# Patient Record
Sex: MALE | Race: WHITE | Employment: UNEMPLOYED | URBAN - METROPOLITAN AREA
[De-identification: names, ages, dates, MRNs, and addresses within clinical notes are randomized per-mention and may not be internally consistent; named-entity substitution may affect disease eponyms.]

---

## 2017-11-15 ENCOUNTER — HOSPITAL ENCOUNTER (EMERGENCY)
Facility: CLINIC | Age: 47
Discharge: HOME | End: 2017-11-15
Attending: EMERGENCY MEDICINE | Admitting: EMERGENCY MEDICINE

## 2017-11-15 VITALS
SYSTOLIC BLOOD PRESSURE: 185 MMHG | HEART RATE: 92 BPM | RESPIRATION RATE: 20 BRPM | OXYGEN SATURATION: 98 % | DIASTOLIC BLOOD PRESSURE: 148 MMHG | TEMPERATURE: 97.2 F | WEIGHT: 200 LBS

## 2017-11-15 DIAGNOSIS — L98.9 SKIN LESION: ICD-10-CM

## 2017-11-15 DIAGNOSIS — L73.9 FOLLICULITIS: ICD-10-CM

## 2017-11-15 DIAGNOSIS — L02.92 BOIL: ICD-10-CM

## 2017-11-15 PROCEDURE — 99282 EMERGENCY DEPT VISIT SF MDM: CPT | Performed by: EMERGENCY MEDICINE

## 2017-11-15 PROCEDURE — 99283 EMERGENCY DEPT VISIT LOW MDM: CPT | Mod: Z6 | Performed by: EMERGENCY MEDICINE

## 2017-11-15 RX ORDER — SULFAMETHOXAZOLE/TRIMETHOPRIM 800-160 MG
1 TABLET ORAL 2 TIMES DAILY
Qty: 20 TABLET | Refills: 0 | Status: SHIPPED | OUTPATIENT
Start: 2017-11-15 | End: 2017-11-25

## 2017-11-15 ASSESSMENT — ENCOUNTER SYMPTOMS
WOUND: 1
SHORTNESS OF BREATH: 0
FEVER: 0
ABDOMINAL PAIN: 0

## 2017-11-15 NOTE — ED AVS SNAPSHOT
[facility] Emergency Department    [address] [place] [address]    Phone:  [phone]    Fax:  [phone]                                       Javi Rosenberg Jr.   MRN: [id number]    Department:  [facility] Emergency Department   Date of Visit:  11/15/2017           Patient Information     Date Of Birth          1970        Your diagnoses for this visit were:     Skin lesion     Boil     Folliculitis        You were seen by Javi Gonzalez MD.        Discharge Instructions       Take antibiotics as prescribed.  This can be obtained for $4 at [employer].  Other pharmacies have coupons for this medicine for approximately $10-$11        Understanding Folliculitis  Folliculitis is when hair follicles become inflamed. Follicles are the tiny holes from which hair grows out of your skin. This skin condition can occur any place on the body where hair grows. But it s often found on the neck, face, and scalp.  How to say it  [id number]   What causes folliculitis?  An infection or irritation can cause this skin condition. It may be from bacteria or a fungus. The condition can also happen from a wound or irritation of the skin. Shaving is a common cause. Some cases may come from taking certain medicines, such as those that treat acne.  Symptoms of folliculitis  This skin condition tends to develop quickly. It looks like little pimples on a base of a red, inflamed hair follicles. These bumps may ooze pus. They may also be:    Itchy    Painful    Red    Swollen  Treatment for folliculitis  Treatment depends on the cause of the inflammation. In some cases, this skin condition will go away on its own in a few days. But it may return. Treatment options include:    Warm compress. Putting a warm, wet washcloth on the inflamed skin may help.    Medicine. Many skin (topical) and oral medicines are available. Antibiotics are used for bacterial infections. Antifungal medicines are best  for fungal infections.    Good hygiene. Keeping the skin clean can help. Use a clean razor when shaving. Prevent ingrown hairs after shaving. This can reduce folliculitis in the beard area. Avoid any substances that bother your skin.  When to call your healthcare provider  Call your healthcare provider right away if you have any of these:    Fever of 100.4 F (38 C) or higher, or as directed    Pain that gets worse    Symptoms that don t get better, or get worse    New symptoms   Date Last Reviewed: 5/1/2016 2000-2017 [employer]. [address] [place] [address]. All rights reserved. This information is not intended as a substitute for professional medical care. Always follow your healthcare professional's instructions.          24 Hour Appointment Hotline       To make an appointment at any [facility], call [phone] ([phone]). If you don't have a family doctor or clinic, we will help you find one. [facility] are conveniently located to serve the needs of you and your family.             Review of your medicines      START taking        Dose / Directions Last dose taken    sulfamethoxazole-trimethoprim 800-160 MG per tablet   Commonly known as:  BACTRIM DS   Dose:  1 tablet   Quantity:  20 tablet        Take 1 tablet by mouth 2 times daily for 10 days   Refills:  0                Prescriptions were sent or printed at these locations (1 Prescription)                   Other Prescriptions                Printed at Department/Unit printer (1 of 1)         sulfamethoxazole-trimethoprim (BACTRIM DS) 800-160 MG per tablet                Orders Needing Specimen Collection     None      Pending Results     No orders found from 11/13/2017 to 11/16/2017.            Pending Culture Results     No orders found from 11/13/2017 to 11/16/2017.            Pending Results Instructions     If you had any lab results that were not finalized at the time of your Discharge, you can call  "the ED Lab Result RN at [phone]. You will be contacted by this team for any positive Lab results or changes in treatment. The nurses are available 7 days a week from 10A to 6:30P.  You can leave a message 24 hours per day and they will return your call.        Thank you for choosing [facility]       Thank you for choosing [facility] for your care. Our goal is always to provide you with excellent care. Hearing back from our patients is one way we can continue to improve our services. Please take a few minutes to complete the written survey that you may receive in the mail after you visit with us. Thank you!        [facility] Information     [facility] lets you send messages to your doctor, view your test results, renew your prescriptions, schedule appointments and more. To sign up, go to [email or web address] . Click on \"Log in\" on the left side of the screen, which will take you to the Welcome page. Then click on \"Sign up Now\" on the right side of the page.     You will be asked to enter the access code listed below, as well as some personal information. Please follow the directions to create your username and password.     Your access code is: [email or web address]  Expires: 2018 11:47 AM     Your access code will  in 90 days. If you need help or a new code, please call your [facility] clinic or [phone].        Care EveryWhere ID     This is your Care EveryWhere ID. This could be used by other organizations to access your [facility] medical records  [id number]        Equal Access to Services     BORIS HOPKINS : Hadii genevieve ku hadasho Soomaali, waaxda luqadaha, qaybta kaalmada adeegyada, ayleen lord . So [facility] [phone].    ATENCIÓN: Si habla español, tiene a adams disposición servicios gratuitos de asistencia lingüística. Llame al [phone].    We comply with applicable federal civil rights laws and [place] laws. We do not discriminate on the basis of race, color, national origin, " age, disability, sex, sexual orientation, or gender identity.            After Visit Summary       This is your record. Keep this with you and show to your community pharmacist(s) and doctor(s) at your next visit.

## 2017-11-15 NOTE — ED AVS SNAPSHOT
[facility] Emergency Department    [address] [place] [address]    Phone:  [phone]    Fax:  [phone]                                       Javi Rosenberg Jr.   MRN: [id number]    Department:  [facility] Emergency Department   Date of Visit:  11/15/2017           After Visit Summary Signature Page     I have received my discharge instructions, and my questions have been answered. I have discussed any challenges I see with this plan with the nurse or doctor.    ..........................................................................................................................................  Patient/Patient Representative Signature      ..........................................................................................................................................  Patient Representative Print Name and Relationship to Patient    ..................................................               ................................................  Date                                            Time    ..........................................................................................................................................  Reviewed by Signature/Title    ...................................................              ..............................................  Date                                                            Time

## 2017-11-15 NOTE — ED NOTES
Patient has multiple areas of rash to his legs and groin which look like large pimples and are very sore.

## 2017-11-15 NOTE — DISCHARGE INSTRUCTIONS
Take antibiotics as prescribed.  This can be obtained for $4 at [employer].  Other pharmacies have coupons for this medicine for approximately $10-$11        Understanding Folliculitis  Folliculitis is when hair follicles become inflamed. Follicles are the tiny holes from which hair grows out of your skin. This skin condition can occur any place on the body where hair grows. But it s often found on the neck, face, and scalp.  How to say it  [id number]   What causes folliculitis?  An infection or irritation can cause this skin condition. It may be from bacteria or a fungus. The condition can also happen from a wound or irritation of the skin. Shaving is a common cause. Some cases may come from taking certain medicines, such as those that treat acne.  Symptoms of folliculitis  This skin condition tends to develop quickly. It looks like little pimples on a base of a red, inflamed hair follicles. These bumps may ooze pus. They may also be:    Itchy    Painful    Red    Swollen  Treatment for folliculitis  Treatment depends on the cause of the inflammation. In some cases, this skin condition will go away on its own in a few days. But it may return. Treatment options include:    Warm compress. Putting a warm, wet washcloth on the inflamed skin may help.    Medicine. Many skin (topical) and oral medicines are available. Antibiotics are used for bacterial infections. Antifungal medicines are best for fungal infections.    Good hygiene. Keeping the skin clean can help. Use a clean razor when shaving. Prevent ingrown hairs after shaving. This can reduce folliculitis in the beard area. Avoid any substances that bother your skin.  When to call your healthcare provider  Call your healthcare provider right away if you have any of these:    Fever of 100.4 F (38 C) or higher, or as directed    Pain that gets worse    Symptoms that don t get better, or get worse    New symptoms   Date Last Reviewed: 5/1/2016 2000-2017 [employer]. [address] [place] [address], [place] [address]. All rights reserved. This information is not intended as a substitute for professional medical care. Always follow your healthcare professional's instructions.

## 2017-11-15 NOTE — ED PROVIDER NOTES
History     Chief Complaint   Patient presents with     Rash     HPI  Javi Rosenberg Jr. is a 47 year old male who is otherwise healthy, presenting to the emergency Department with complaints of multiple lesions diffusely throughout the skin.  Patient states that he has in the past had skin lesions that frequently pop up on the face, however disappear on their own.  However, over the past 4-5 days, patient has had multiple skin lesions located on his lower extremities, groin, buttock, that have not gone away.  He denies any fever.  No recent antibiotic use, or other medication use.  No chest pain and abdominal pain, shortness of breath, or other symptoms.  Reports no history of MRSA.  However, chart review does show that patient has had culture positive MRSA previously.    Problem List:    There are no active problems to display for this patient.       Past Medical History:    Past Medical History:   Diagnosis Date     MRSA (methicillin resistant staph aureus) culture positive        Past Surgical History:    History reviewed. No pertinent surgical history.    Family History:    No family history on file.    Social History:  Marital Status:  Single [1]  Social History   Substance Use Topics     Smoking status: Not on file     Smokeless tobacco: Not on file     Alcohol use Not on file        Medications:      sulfamethoxazole-trimethoprim (BACTRIM DS) 800-160 MG per tablet         Review of Systems   Constitutional: Negative for fever.   Respiratory: Negative for shortness of breath.    Cardiovascular: Negative for chest pain.   Gastrointestinal: Negative for abdominal pain.   Skin: Positive for wound.   All other systems reviewed and are negative.      Physical Exam   BP: (!) 185/148  Pulse: 92  Temp: 97.2  F (36.2  C)  Resp: 20  Weight: 90.7 kg (200 lb)  SpO2: 98 %      Physical Exam  BP (!) 185/148  Pulse 92  Temp 97.2  F (36.2  C) (Oral)  Resp 20  Wt 90.7 kg (200 lb)  SpO2 98%  General: alert and in no  acute distress  Head: atraumatic, normocephalic  Abd: Soft, nontender, nondistended, no peritoneal signs  Musculoskel/Extremities: multiple skin lesions of legs, buttock.  All measuring about 3cm or less, with baseline erythema.  There are some areas where there are small pustular lesions, none of which would require incision and drainage at this point.  Neuro: Patient awake, alert, oriented, speech is fluent, gait is normal  Psychiatric: affect/mood normal, cooperative, normal judgement/insight and memory intact      ED Course     ED Course     Procedures               Critical Care time:  none               Labs Ordered and Resulted from Time of ED Arrival Up to the Time of Departure from the ED - No data to display    Assessments & Plan (with Medical Decision Making)  47 year old male , healthy, with no medical care as patient does not have any insurance, presenting to the emergency department today because of skin lesions.  He has noticed these have increased over the past approximately 4 days.  Denies fever, chills.  No other symptoms.  Patient does arrive hypertensive, and otherwise normal vitals.  No other concerns based on history, and physical exam is consistent with multiple areas of folliculitis, with small pustular lesions, consistent with likely MRSA infection.  Patient will be prescribed Bactrim, follow up as needed with primary care provider.  No indication for additional testing at this point.       I have reviewed the nursing notes.    I have reviewed the findings, diagnosis, plan and need for follow up with the patient.       New Prescriptions    SULFAMETHOXAZOLE-TRIMETHOPRIM (BACTRIM DS) 800-160 MG PER TABLET    Take 1 tablet by mouth 2 times daily for 10 days       Final diagnoses:   Skin lesion   Boil   Folliculitis       11/15/2017   [facility] EMERGENCY DEPARTMENT     Javi Gonzalez MD  11/15/17 2813

## 2018-01-25 ENCOUNTER — HOSPITAL ENCOUNTER (EMERGENCY)
Facility: CLINIC | Age: 48
Discharge: HOME | End: 2018-01-25
Attending: PHYSICIAN ASSISTANT | Admitting: PHYSICIAN ASSISTANT

## 2018-01-25 VITALS
HEART RATE: 115 BPM | DIASTOLIC BLOOD PRESSURE: 125 MMHG | SYSTOLIC BLOOD PRESSURE: 171 MMHG | TEMPERATURE: 98.2 F | WEIGHT: 199.96 LBS | OXYGEN SATURATION: 98 % | RESPIRATION RATE: 18 BRPM

## 2018-01-25 DIAGNOSIS — L73.8 FOLLICULITIS BARBAE: ICD-10-CM

## 2018-01-25 DIAGNOSIS — I10 ELEVATED BLOOD PRESSURE READING WITH DIAGNOSIS OF HYPERTENSION: ICD-10-CM

## 2018-01-25 DIAGNOSIS — L02.92 BOIL: ICD-10-CM

## 2018-01-25 PROCEDURE — 99284 EMERGENCY DEPT VISIT MOD MDM: CPT | Mod: Z6 | Performed by: PHYSICIAN ASSISTANT

## 2018-01-25 PROCEDURE — 99282 EMERGENCY DEPT VISIT SF MDM: CPT | Performed by: PHYSICIAN ASSISTANT

## 2018-01-25 RX ORDER — SULFAMETHOXAZOLE/TRIMETHOPRIM 800-160 MG
1 TABLET ORAL 2 TIMES DAILY
Qty: 20 TABLET | Refills: 0 | Status: SHIPPED | OUTPATIENT
Start: 2018-01-25 | End: 2018-02-04

## 2018-01-25 ASSESSMENT — ENCOUNTER SYMPTOMS
MYALGIAS: 0
FEVER: 0
CHILLS: 0
WOUND: 1
SHORTNESS OF BREATH: 0

## 2018-01-25 NOTE — ED NOTES
Rash on face and boils on sonia area. MRSA with treatment 1 month ago and rash temporarily resolved and is now back.

## 2018-01-25 NOTE — ED PROVIDER NOTES
History     Chief Complaint   Patient presents with     Rash     HPI  Javi Rosenberg Jr. is a 47 year old male who presents to the emergency department complaining of a rash.  Patient complains that for the last week and a half he has had issues with boils on his buttocks.  They are very sore to sit on and have drained purulent discharge at times.  He has had issues with this in the past, more recently in November 2017 where he was evaluated here in the ED.  He was prescribed Bactrim was cleared things up until now.  He also notes a rash to his face and scalp that he has had for over a year.  It is non-itchy, nonpainful, and does not drain anything.  This also cleared up after being on Bactrim back in November, but in the last week and a half has come back.  It causes him to feel self-conscious because it makes him look like he has acne.  He denies fever, body aches, chills, or any other symptoms.  He has not taken anything for his symptoms.    Problem List:    There are no active problems to display for this patient.       Past Medical History:    Past Medical History:   Diagnosis Date     MRSA (methicillin resistant staph aureus) culture positive        Past Surgical History:    History reviewed. No pertinent surgical history.    Family History:    No family history on file.    Social History:  Marital Status:  Single [1]  Social History   Substance Use Topics     Smoking status: Former Smoker     Smokeless tobacco: Never Used     Alcohol use Yes      Comment: occ        Medications:      sulfamethoxazole-trimethoprim (BACTRIM DS) 800-160 MG per tablet         Review of Systems   Constitutional: Negative for chills and fever.   Respiratory: Negative for shortness of breath.    Cardiovascular: Negative for chest pain.   Musculoskeletal: Negative for myalgias.   Skin: Positive for rash (face) and wound (buttocks).   All other systems reviewed and are negative.      Physical Exam   BP: (!) 180/122  Pulse:  122  Heart Rate: 122  Temp: 98.2  F (36.8  C)  Resp: 18  Weight: 90.7 kg (199 lb 15.3 oz)  SpO2: 98 %      Physical Exam   Constitutional: He is oriented to person, place, and time. He appears well-developed and well-nourished. No distress.   HENT:   Head: Normocephalic and atraumatic.   Eyes: Conjunctivae are normal.   Neck: Normal range of motion.   Pulmonary/Chest: Effort normal. No respiratory distress.   Musculoskeletal: Normal range of motion.   Neurological: He is alert and oriented to person, place, and time.   Skin: Skin is warm and dry. He is not diaphoretic.   Multiple erythematous lesions approximately 3 cm in diameter to buttocks. They are scabbed over, but tender to touch.  No underlying fluctuance.  Maculopapular rash to face near beard line and up to hairline.  Nontender, no purulent drainage.   Psychiatric: His mood appears anxious. His speech is rapid and/or pressured.       ED Course     ED Course     Procedures      Assessments & Plan (with Medical Decision Making)  Javi LOPEZ Chet Medel Is a 47 year old who presented to the ED complaining of a rash to his face and boils to his buttocks.  This began a week and a half ago, but he has had issues with this in the past.  Denies any other symptoms.  On arrival to the ED he was hypertensive and tachycardic but very anxious. He had a maculopapular rash across his face and the beard line up to the scalp.  Also with several erythematous, tender lesions to buttocks concerning for cellulitis without underlying fluctuance to suggest indication for drainage today.  His facial rash he has had for over a year, and it does appear somewhat consistent with folliculitis barbae. He has a history of MRSA.  He reports that Bactrim did help resolve rash for several months, so this will be prescribed again today.  I do think he needs to follow-up in the clinic for further management of his rash.  He was provided the contact information of jerry in [facility] to  call and schedule this appointment.  He should refrain from shaving until rash resolves, and I advised good personal hygiene to prevent recurrence.  Also of note, blood pressure rechecked prior to discharge and was still quite elevated.  Patient explained that he is supposed to be taking atenolol but has not been for 2 years due to cost.  He has no symptoms of chest pain, shortness of breath, blurred vision, or headaches. I did suggest restarting this medication and doing formal eval for his blood pressure here, but he declined. He was then encouraged to at least get this reevaluated in outpatient basis. He was given instructions to return to the ED if symptoms worsen.  Patient in agreement with plan and discharged home.     I have reviewed the nursing notes.    I have reviewed the findings, diagnosis, plan and need for follow up with the patient.    New Prescriptions    SULFAMETHOXAZOLE-TRIMETHOPRIM (BACTRIM DS) 800-160 MG PER TABLET    Take 1 tablet by mouth 2 times daily for 10 days       Final diagnoses:   Boil   Folliculitis barbae   Elevated blood pressure reading with diagnosis of hypertension       Note: Chart documentation done in part with Dragon Voice Recognition software. Although reviewed after completion, some word and grammatical errors may remain.     1/25/2018   [facility] EMERGENCY DEPARTMENT     Abi Euceda PA-C  01/25/18 [id number]

## 2018-01-25 NOTE — ED AVS SNAPSHOT
[facility] Emergency Department    [address] [place] [address]    Phone:  [phone]    Fax:  [phone]                                       Javi Rosenberg Jr.   MRN: [id number]    Department:  [facility] Emergency Department   Date of Visit:  1/25/2018           Patient Information     Date Of Birth          1970        Your diagnoses for this visit were:     Boil     Folliculitis barbae        You were seen by Abi Euceda PA-C.      Follow-up Information     Call [facility].    Specialty:  Family Practice    Why:  For ER follow up    Contact information:    [address]  [place] [id number] [phone]    Additional information:    Clinic is located at [address] [place] [address]        Call [facility].    Specialty:  Family Practice    Why:  For ER follow up    Contact information:    [address] [facility] [address] [facility] [address] [phone]    Additional information:    Clinic is located at [address] [place] [address]        Follow up with [facility] Emergency Department.    Specialty:  EMERGENCY MEDICINE    Why:  If symptoms worsen    Contact information:    [address]   [facility] [id number] [phone]    Additional information:    From [address]: Exit at [address] ([place], [place]) on south side of [place]. Turn right on [address]. Turn left at stoplight on [address]. [facility] will be in view two blocks ahead        Discharge Instructions       Take the antibiotic as prescribed.  Call and schedule a follow-up visit in the clinic for reassessment as soon as possible.  Return to the emergency department if you develop fever, body aches, or other worsening symptoms.    Thank you for choosing [facility]'s Emergency Department. It was a pleasure taking care of you today. If you have any questions, please call [phone].    Abi  JEFF Euceda    Folliculitis  Folliculitis is an inflammation of a hair follicle. A hair follicle is the little pocket where a hair grows out of the skin. Bacteria normally live on the skin. But sometimes bacteria can get trapped in a follicle and cause infection. This causes a bumpy rash. The area over the follicles is red and raised. It may itch or be painful. The bumps may have fluid (pus) inside. The pus may leak and then form crusts. Sores can spread to other areas of the body. Once it goes away, folliculitis can come back at any time. Severe cases may cause permanent hair loss and scarring.  Folliculitis can happen anywhere on the body where hair grows. It can be caused by rubbing from tight clothing. Ingrown hairs can cause it. Soaking in a hot tub or swimming pool that has bacteria in the water can cause it. It may also occur if a hair follicle is blocked by a bandage.  Sores often go away in a few days with no treatment. In some cases, medicine may be given. A small piece of skin or pus may be taken to find the type of bacteria causing the infection.  Home care  The healthcare provider may prescribe an antibiotic cream or ointment.  Oral antibiotics may also be prescribed. Or you may be told to use an over-the-counter antibiotic cream. Follow all instructions when using any of these medicines.  General care:    Apply warm, moist compresses to the sores for 20 minutes up to 3 times a day. You can make a compress by soaking a cloth in warm water. Squeeze out excess water.    Don t cut, poke, or squeeze the sores. This can be painful and spread infection.    Don t scratch the affected area. Scratching can delay healing.    Don t shave the areas affected by folliculitis.    If the sores leak fluid, cover the area with a nonstick gauze bandage. Use as little tape as possible. Carefully discard all soiled bandages.    Dress in loose cotton clothing.    Change sheets and blankets if they are soiled by pus. Wash  all clothes, towels, sheets, and cloth diapers in soap and hot water. Do not share clothes, towels, or sheets with other family members.    Do not soak the sores in bath water. This can spread infection. Instead, keep the area clean by gently washing sores with soap and warm water.    Wash your hands or use antibacterial gels often to prevent spreading the bacteria.  Follow-up care  Follow up with your healthcare provider, or as advised.  When to seek medical advice  Call your healthcare provider right away if any of these occur:    Fever of 100.4 F (38 C) or higher    Spreading of the rash    Rash does not get better with treatment    Redness or swelling that gets worse    Rash becomes more painful    Foul-smelling fluid leaking from the skin    Rash improves, but then comes back           24 Hour Appointment Hotline       To make an appointment at any [facility], call [phone] ([phone]). If you don't have a family doctor or clinic, we will help you find one. [place] clinics are conveniently located to serve the needs of you and your family.             Review of your medicines      START taking        Dose / Directions Last dose taken    sulfamethoxazole-trimethoprim 800-160 MG per tablet   Commonly known as:  BACTRIM DS   Dose:  1 tablet   Quantity:  20 tablet        Take 1 tablet by mouth 2 times daily for 10 days   Refills:  0                Prescriptions were sent or printed at these locations (1 Prescription)                   [facility] [address] [place] [address] [address]   [address] [facility] [address]    Telephone:  [phone]   Fax:  [phone]   Hours:                  E-Prescribed (1 of 1)         sulfamethoxazole-trimethoprim (BACTRIM DS) 800-160 MG per tablet                Orders Needing Specimen Collection     None      Pending Results     No orders found from 1/23/2018 to 1/26/2018.            Pending Culture Results     No orders found from 1/23/2018 to  "2018.            Pending Results Instructions     If you had any lab results that were not finalized at the time of your Discharge, you can call the ED Lab Result RN at [phone]. You will be contacted by this team for any positive Lab results or changes in treatment. The nurses are available 7 days a week from 10A to 6:30P.  You can leave a message 24 hours per day and they will return your call.        Thank you for choosing [facility]       Thank you for choosing [facility] for your care. Our goal is always to provide you with excellent care. Hearing back from our patients is one way we can continue to improve our services. Please take a few minutes to complete the written survey that you may receive in the mail after you visit with us. Thank you!        [employer] Information     [email or web address] lets you send messages to your doctor, view your test results, renew your prescriptions, schedule appointments and more. To sign up, go to [email or web address] . Click on \"Log in\" on the left side of the screen, which will take you to the Welcome page. Then click on \"Sign up Now\" on the right side of the page.     You will be asked to enter the access code listed below, as well as some personal information. Please follow the directions to create your username and password.     Your access code is: [email or web address]  Expires: 2018 11:47 AM     Your access code will  in 90 days. If you need help or a new code, please call your [facility] clinic or [phone].        Care EveryWhere ID     This is your Care EveryWhere ID. This could be used by other organizations to access your [facility] medical records  [id number]        Equal Access to Services     [facility] Hospital[facility] : Hadii genevieve Paul, latasha tolbert, ayleen iglesias. [facility] [phone].    ATENCIÓN: Si habla español, tiene a adams disposición servicios gratuitos de asistencia lingüística. Llame al " [phone].    We comply with applicable federal civil rights laws and [place] laws. We do not discriminate on the basis of race, color, national origin, age, disability, sex, sexual orientation, or gender identity.            After Visit Summary       This is your record. Keep this with you and show to your community pharmacist(s) and doctor(s) at your next visit.

## 2018-01-25 NOTE — ED AVS SNAPSHOT
[facility] Emergency Department    [address] [place] [address]    Phone:  [phone]    Fax:  [phone]                                       Javi Rosenberg Jr.   MRN: [id number]    Department:  [facility] Emergency Department   Date of Visit:  1/25/2018           After Visit Summary Signature Page     I have received my discharge instructions, and my questions have been answered. I have discussed any challenges I see with this plan with the nurse or doctor.    ..........................................................................................................................................  Patient/Patient Representative Signature      ..........................................................................................................................................  Patient Representative Print Name and Relationship to Patient    ..................................................               ................................................  Date                                            Time    ..........................................................................................................................................  Reviewed by Signature/Title    ...................................................              ..............................................  Date                                                            Time

## 2018-01-26 NOTE — DISCHARGE INSTRUCTIONS
Take the antibiotic as prescribed.  Call and schedule a follow-up visit in the clinic for reassessment as soon as possible.  Return to the emergency department if you develop fever, body aches, or other worsening symptoms.    Thank you for choosing [facility]'s Emergency Department. It was a pleasure taking care of you today. If you have any questions, please call [phone].    Abi Euceda PA-C    Folliculitis  Folliculitis is an inflammation of a hair follicle. A hair follicle is the little pocket where a hair grows out of the skin. Bacteria normally live on the skin. But sometimes bacteria can get trapped in a follicle and cause infection. This causes a bumpy rash. The area over the follicles is red and raised. It may itch or be painful. The bumps may have fluid (pus) inside. The pus may leak and then form crusts. Sores can spread to other areas of the body. Once it goes away, folliculitis can come back at any time. Severe cases may cause permanent hair loss and scarring.  Folliculitis can happen anywhere on the body where hair grows. It can be caused by rubbing from tight clothing. Ingrown hairs can cause it. Soaking in a hot tub or swimming pool that has bacteria in the water can cause it. It may also occur if a hair follicle is blocked by a bandage.  Sores often go away in a few days with no treatment. In some cases, medicine may be given. A small piece of skin or pus may be taken to find the type of bacteria causing the infection.  Home care  The healthcare provider may prescribe an antibiotic cream or ointment.  Oral antibiotics may also be prescribed. Or you may be told to use an over-the-counter antibiotic cream. Follow all instructions when using any of these medicines.  General care:    Apply warm, moist compresses to the sores for 20 minutes up to 3 times a day. You can make a compress by soaking a cloth in warm water. Squeeze out excess water.    Don t cut, poke, or squeeze the sores.  This can be painful and spread infection.    Don t scratch the affected area. Scratching can delay healing.    Don t shave the areas affected by folliculitis.    If the sores leak fluid, cover the area with a nonstick gauze bandage. Use as little tape as possible. Carefully discard all soiled bandages.    Dress in loose cotton clothing.    Change sheets and blankets if they are soiled by pus. Wash all clothes, towels, sheets, and cloth diapers in soap and hot water. Do not share clothes, towels, or sheets with other family members.    Do not soak the sores in bath water. This can spread infection. Instead, keep the area clean by gently washing sores with soap and warm water.    Wash your hands or use antibacterial gels often to prevent spreading the bacteria.  Follow-up care  Follow up with your healthcare provider, or as advised.  When to seek medical advice  Call your healthcare provider right away if any of these occur:    Fever of 100.4 F (38 C) or higher    Spreading of the rash    Rash does not get better with treatment    Redness or swelling that gets worse    Rash becomes more painful    Foul-smelling fluid leaking from the skin    Rash improves, but then comes back

## 2021-09-08 ENCOUNTER — HOSPITAL ENCOUNTER (EMERGENCY)
Facility: CLINIC | Age: 51
Discharge: HOME | End: 2021-09-08
Attending: NURSE PRACTITIONER | Admitting: NURSE PRACTITIONER
Payer: MEDICAID

## 2021-09-08 VITALS
WEIGHT: 216.4 LBS | RESPIRATION RATE: 20 BRPM | DIASTOLIC BLOOD PRESSURE: 98 MMHG | TEMPERATURE: 98.4 F | SYSTOLIC BLOOD PRESSURE: 140 MMHG | OXYGEN SATURATION: 97 % | HEART RATE: 95 BPM

## 2021-09-08 DIAGNOSIS — K04.7 DENTAL INFECTION: ICD-10-CM

## 2021-09-08 PROCEDURE — 99283 EMERGENCY DEPT VISIT LOW MDM: CPT

## 2021-09-08 PROCEDURE — 99284 EMERGENCY DEPT VISIT MOD MDM: CPT | Performed by: NURSE PRACTITIONER

## 2021-09-08 PROCEDURE — 250N000013 HC RX MED GY IP 250 OP 250 PS 637: Performed by: FAMILY MEDICINE

## 2021-09-08 PROCEDURE — 250N000013 HC RX MED GY IP 250 OP 250 PS 637: Performed by: NURSE PRACTITIONER

## 2021-09-08 RX ORDER — CLINDAMYCIN HCL 150 MG
300 CAPSULE ORAL ONCE
Status: COMPLETED | OUTPATIENT
Start: 2021-09-08 | End: 2021-09-08

## 2021-09-08 RX ORDER — IBUPROFEN 600 MG/1
600 TABLET, FILM COATED ORAL ONCE
Status: COMPLETED | OUTPATIENT
Start: 2021-09-08 | End: 2021-09-08

## 2021-09-08 RX ORDER — CLINDAMYCIN HCL 300 MG
300 CAPSULE ORAL 4 TIMES DAILY
Qty: 28 CAPSULE | Refills: 0 | Status: SHIPPED | OUTPATIENT
Start: 2021-09-08 | End: 2021-09-15

## 2021-09-08 RX ORDER — OXYCODONE HYDROCHLORIDE 5 MG/1
5 TABLET ORAL EVERY 6 HOURS PRN
Qty: 6 TABLET | Refills: 0 | Status: SHIPPED | OUTPATIENT
Start: 2021-09-08

## 2021-09-08 RX ADMIN — CLINDAMYCIN HYDROCHLORIDE 300 MG: 150 CAPSULE ORAL at 22:54

## 2021-09-08 RX ADMIN — IBUPROFEN 600 MG: 600 TABLET, FILM COATED ORAL at 22:07

## 2021-09-09 NOTE — ED PROVIDER NOTES
History     Chief Complaint   Patient presents with     Dental Pain     HPI  Javi Rosenberg Jr. is a 51 year old male who presents to the emergency department for evaluation of right upper dental pain.  Patient states that he started to have some achiness of the right upper dental region last evening.  Today he started having pain and swelling.  He has poor dentition.  He has not called the dentist.  Denies fever or chills.  Denies nausea or vomiting.  Patient states he is been treated before for similar dental infections with antibiotics and has improved.    Allergies:  Allergies   Allergen Reactions     Penicillins      was told his throat swells       Problem List:    There are no problems to display for this patient.       Past Medical History:    Past Medical History:   Diagnosis Date     MRSA (methicillin resistant staph aureus) culture positive        Past Surgical History:    History reviewed. No pertinent surgical history.    Family History:    No family history on file.    Social History:  Marital Status:  Single [1]  Social History     Tobacco Use     Smoking status: Former Smoker     Smokeless tobacco: Never Used   Substance Use Topics     Alcohol use: Yes     Comment: occ     Drug use: Yes     Types: Marijuana        Medications:    clindamycin (CLEOCIN) 300 MG capsule  oxyCODONE (ROXICODONE) 5 MG tablet        Review of Systems  As mentioned above in the history present illness. All other systems were reviewed and are negative.    Physical Exam   BP: (!) 142/104  Pulse: 99  Temp: 98.4  F (36.9  C)  Resp: 20  Weight: 98.2 kg (216 lb 6.4 oz)  SpO2: 97 %      Physical Exam  Constitutional:       General: He is not in acute distress.     Appearance: Normal appearance. He is not ill-appearing.   HENT:      Head: Normocephalic and atraumatic.      Right Ear: Tympanic membrane and ear canal normal.      Left Ear: Tympanic membrane and ear canal normal.      Nose: Nose normal.      Mouth/Throat:      Mouth:  Mucous membranes are moist.      Dentition: Abnormal dentition. Dental tenderness (right upper) and dental caries (severe) present. No dental abscesses (no palpable fluctuance or swelling fo the gingiva).      Pharynx: Oropharynx is clear. Uvula midline.        Comments:  facial swelling is Present to the right maxillary region   Teeth carious:yes and severe    Teeth broken: yes and severe     No trismus.      Eyes:      Conjunctiva/sclera: Conjunctivae normal.   Cardiovascular:      Rate and Rhythm: Normal rate and regular rhythm.      Heart sounds: No murmur heard.     Pulmonary:      Effort: Pulmonary effort is normal.      Breath sounds: Normal breath sounds.   Neurological:      Mental Status: He is alert.               ED Course        Procedures         No results found for this or any previous visit (from the past 24 hour(s)).    Medications   ibuprofen (ADVIL/MOTRIN) tablet 600 mg (600 mg Oral Given 9/8/21 2207)   clindamycin (CLEOCIN) capsule 300 mg (300 mg Oral Given 9/8/21 2254)       Assessments & Plan (with Medical Decision Making)  Clindamycin 300 mg 4 times a day for 7 days.  Your first dose was given here today.  Tylenol 650 mg every 4-6 hours as needed for pain.  Ibuprofen 400-600 mg every 6-8 hours as needed for pain  (take with food, stop if causing stomach pains.)  Oxycodone 5 mg every 6 hours as needed for moderate/severe pain.  Do not drive or drink alcohol on this medication.  You need to see a dentist as soon as possible.  Return to the emergency department for fevers, increased facial swelling, facial redness, vomiting, or worse in any way.         I have reviewed the nursing notes.    I have reviewed the findings, diagnosis, plan and need for follow up with the patient.      Discharge Medication List as of 9/8/2021 10:56 PM      START taking these medications    Details   clindamycin (CLEOCIN) 300 MG capsule Take 1 capsule (300 mg) by mouth 4 times daily for 7 days, Disp-28 capsule, R-0,  E-Prescribe      oxyCODONE (ROXICODONE) 5 MG tablet Take 1 tablet (5 mg) by mouth every 6 hours as needed for severe pain, Disp-6 tablet, R-0, InstyMeds             Final diagnoses:   Dental infection       9/8/2021   [facility] EMERGENCY DEPT     Angela, Martha William, VON CNP  09/08/21 4953

## 2021-09-09 NOTE — DISCHARGE INSTRUCTIONS
Clindamycin 300 mg 4 times a day for 7 days.  Your first dose was given here today.  Tylenol 650 mg every 4-6 hours as needed for pain.  Ibuprofen 400-600 mg every 6-8 hours as needed for pain  (take with food, stop if causing stomach pains.)  Oxycodone 5 mg every 6 hours as needed for moderate/severe pain.  Do not drive or drink alcohol on this medication.  You need to see a dentist as soon as possible.  Return to the emergency department for fevers, increased facial swelling, facial redness, vomiting, or worse in any way.

## 2025-06-30 ENCOUNTER — OFFICE VISIT (OUTPATIENT)
Dept: FAMILY MEDICINE | Facility: CLINIC | Age: 55
End: 2025-06-30
Payer: COMMERCIAL

## 2025-06-30 VITALS
SYSTOLIC BLOOD PRESSURE: 190 MMHG | HEART RATE: 109 BPM | DIASTOLIC BLOOD PRESSURE: 130 MMHG | RESPIRATION RATE: 16 BRPM | BODY MASS INDEX: 36.65 KG/M2 | HEIGHT: 65 IN | WEIGHT: 220 LBS | TEMPERATURE: 98.1 F | OXYGEN SATURATION: 97 %

## 2025-06-30 DIAGNOSIS — F41.1 GENERALIZED ANXIETY DISORDER: ICD-10-CM

## 2025-06-30 DIAGNOSIS — Z13.220 SCREENING FOR LIPOID DISORDERS: ICD-10-CM

## 2025-06-30 DIAGNOSIS — I10 ESSENTIAL HYPERTENSION: ICD-10-CM

## 2025-06-30 DIAGNOSIS — F15.20 METHAMPHETAMINE ADDICTION (H): ICD-10-CM

## 2025-06-30 DIAGNOSIS — L72.3 SEBACEOUS CYST: Primary | ICD-10-CM

## 2025-06-30 PROCEDURE — 36415 COLL VENOUS BLD VENIPUNCTURE: CPT | Performed by: INTERNAL MEDICINE

## 2025-06-30 PROCEDURE — 99204 OFFICE O/P NEW MOD 45 MIN: CPT | Performed by: INTERNAL MEDICINE

## 2025-06-30 PROCEDURE — 80048 BASIC METABOLIC PNL TOTAL CA: CPT | Performed by: INTERNAL MEDICINE

## 2025-06-30 PROCEDURE — 3077F SYST BP >= 140 MM HG: CPT | Performed by: INTERNAL MEDICINE

## 2025-06-30 PROCEDURE — 1126F AMNT PAIN NOTED NONE PRSNT: CPT | Performed by: INTERNAL MEDICINE

## 2025-06-30 PROCEDURE — 3080F DIAST BP >= 90 MM HG: CPT | Performed by: INTERNAL MEDICINE

## 2025-06-30 PROCEDURE — 80061 LIPID PANEL: CPT | Performed by: INTERNAL MEDICINE

## 2025-06-30 RX ORDER — LOSARTAN POTASSIUM 50 MG/1
50 TABLET ORAL DAILY
Qty: 30 TABLET | Refills: 1 | Status: SHIPPED | OUTPATIENT
Start: 2025-06-30

## 2025-06-30 ASSESSMENT — PAIN SCALES - GENERAL: PAINLEVEL_OUTOF10: NO PAIN (0)

## 2025-06-30 NOTE — PROGRESS NOTES
"  Assessment & Plan     (L72.3) Sebaceous cyst  (primary encounter diagnosis)  Comment: A rather large sebaceous cyst on the right upper back.  Plan: Adult Gen Surg  Referral for incision and drainage      (I10) Essential hypertension  Comment: Significantly elevated blood pressures.  This may be stress and anxiety related.  He was previously on a beta-blocker.  He had side effects of erectile dysfunction.  Plan: Will put him on losartan 50 mg tablet will have him take half tablet a day for 6 days then go up to a whole tablet.  Will check his renal function.  Will have him follow-up in 3 to 4 weeks.    (F41.1) Generalized anxiety disorder  Comment: Noted.  He does not want to be on medications.  He would like to go for some counseling  Plan: Adult Mental Health  Referral             (Z13.220) Screening for lipoid disorders  Comment: Significantly elevated blood pressures noted  Plan: Lipid panel reflex to direct LDL Fasting             (F15.20) Methamphetamine addiction (H)  Comment: Noted.  He has been through treatment in the past.  He does not want to go through treatment again.  Plan: I recommended cessation.           BMI  Estimated body mass index is 36.61 kg/m  as calculated from the following:    Height as of this encounter: 1.651 m (5' 5\").    Weight as of this encounter: 99.8 kg (220 lb).             Magdiel Caldwell is a 55 year old, presenting for the following health issues:  Cyst        6/30/2025     1:46 PM   Additional Questions   Roomed by Flory BURDEN   Accompanied by Self     History of Present Illness       Reason for visit:  Cyst on my back want it removed   He is taking medications regularly.      55-year-old male presents with a history of a lesion on the right upper back.  He has a history of having a sebaceous cyst in that area.  It was incised and drained he reports this being done in the clinic at the [facility] in December 2010.  The lesion has reoccurred.  Is " "not causing any pain or discomfort.  He would like to have it incised and drained.  He also complains of hypertension.  He notes significantly elevated blood pressures.  He was recently on a blood pressure medication when he was in prison.  He thinks it was a beta-blocker, but he is not sure of the exact medication.  He notes that he had trouble with erections while on that medication.  He does not want to be on beta-blockers.  He complains of anxiety.  He states that he is a methamphetamine user.  He has been using methamphetamine for years.        Objective    BP (!) 195/140   Pulse 109   Temp 98.1  F (36.7  C) (Tympanic)   Resp 16   Ht 1.651 m (5' 5\")   Wt 99.8 kg (220 lb)   SpO2 97%   BMI 36.61 kg/m    Body mass index is 36.61 kg/m .  Physical Exam   GENERAL: alert and no distress  EYES: Eyes grossly normal to inspection, PERRL and conjunctivae and sclerae normal  HENT: ear canals and TM's normal, nose and mouth without ulcers or lesions  NECK: no adenopathy, no asymmetry, masses, or scars  RESP: lungs clear to auscultation - no rales, rhonchi or wheezes  CV: regular rate and rhythm, normal S1 S2, no S3 or S4, no murmur, click or rub, no peripheral edema  ABDOMEN: soft, nontender, no hepatosplenomegaly, no masses and bowel sounds normal  SKIN: On his back just to the right of the base of the neck is a walnut sized sebaceous cyst without erythema or tenderness.  No prominent pore is identified.  NEURO: Normal strength and tone, mentation intact and speech normal  PSYCH: mentation appears normal, affect normal/bright            Signed Electronically by: David Dna MD    "

## 2025-07-01 ENCOUNTER — RESULTS FOLLOW-UP (OUTPATIENT)
Dept: INTERNAL MEDICINE | Facility: CLINIC | Age: 55
End: 2025-07-01
Payer: COMMERCIAL

## 2025-07-01 ENCOUNTER — PATIENT OUTREACH (OUTPATIENT)
Dept: CARE COORDINATION | Facility: CLINIC | Age: 55
End: 2025-07-01
Payer: COMMERCIAL

## 2025-07-01 LAB
ANION GAP SERPL CALCULATED.3IONS-SCNC: 11 MMOL/L (ref 7–15)
BUN SERPL-MCNC: 21.6 MG/DL (ref 6–20)
CALCIUM SERPL-MCNC: 9.8 MG/DL (ref 8.8–10.4)
CHLORIDE SERPL-SCNC: 103 MMOL/L (ref 98–107)
CHOLEST SERPL-MCNC: 185 MG/DL
CREAT SERPL-MCNC: 1.14 MG/DL (ref 0.67–1.17)
EGFRCR SERPLBLD CKD-EPI 2021: 76 ML/MIN/1.73M2
FASTING STATUS PATIENT QL REPORTED: NO
FASTING STATUS PATIENT QL REPORTED: NO
GLUCOSE SERPL-MCNC: 128 MG/DL (ref 70–99)
HCO3 SERPL-SCNC: 25 MMOL/L (ref 22–29)
HDLC SERPL-MCNC: 48 MG/DL
LDLC SERPL CALC-MCNC: 105 MG/DL
NONHDLC SERPL-MCNC: 137 MG/DL
POTASSIUM SERPL-SCNC: 4.5 MMOL/L (ref 3.4–5.3)
SODIUM SERPL-SCNC: 139 MMOL/L (ref 135–145)
TRIGL SERPL-MCNC: 158 MG/DL

## 2025-07-01 NOTE — TELEPHONE ENCOUNTER
Left message on answering machine for patient to call back to [phone].  Sofie Sullivan BSN, RN

## 2025-07-02 NOTE — TELEPHONE ENCOUNTER
Patient called back and the provider message was read to him. He didn't have any further questions.     Hailey DUARTEN, RN

## 2025-07-02 NOTE — TELEPHONE ENCOUNTER
Provider: We have tried twice without a return call. Can the TC team mail this information to him? If so, please note so and send to that team.  Thank you. Meli Solitario R.N.    I left a message to return a call to [phone].  David Maradiaga R.N., MD to [facility]  (Selected Message)  TP    7/1/25  5:42 PM  Result Note  Kidney function is normal.  Blood sugar slightly elevated.  Cholesterol panel looks good.  He should have a follow-up blood sugar in 1 year  David Dan MD on 7/1/2025 at 5:42 PM   Lipid panel reflex to direct LDL Fasting; Basic metabolic panel  (Ca, Cl, CO2, Creat, Gluc, K, Na, BUN)

## 2025-07-03 ENCOUNTER — PATIENT OUTREACH (OUTPATIENT)
Dept: CARE COORDINATION | Facility: CLINIC | Age: 55
End: 2025-07-03
Payer: COMMERCIAL